# Patient Record
Sex: FEMALE | Race: WHITE | NOT HISPANIC OR LATINO
[De-identification: names, ages, dates, MRNs, and addresses within clinical notes are randomized per-mention and may not be internally consistent; named-entity substitution may affect disease eponyms.]

---

## 2019-04-07 ENCOUNTER — TRANSCRIPTION ENCOUNTER (OUTPATIENT)
Age: 29
End: 2019-04-07

## 2019-05-21 ENCOUNTER — TRANSCRIPTION ENCOUNTER (OUTPATIENT)
Age: 29
End: 2019-05-21

## 2020-02-24 ENCOUNTER — TRANSCRIPTION ENCOUNTER (OUTPATIENT)
Age: 30
End: 2020-02-24

## 2020-10-01 ENCOUNTER — APPOINTMENT (OUTPATIENT)
Dept: INTERNAL MEDICINE | Facility: CLINIC | Age: 30
End: 2020-10-01
Payer: COMMERCIAL

## 2020-10-01 VITALS
WEIGHT: 173 LBS | TEMPERATURE: 97.6 F | OXYGEN SATURATION: 97 % | SYSTOLIC BLOOD PRESSURE: 112 MMHG | RESPIRATION RATE: 14 BRPM | HEART RATE: 94 BPM | DIASTOLIC BLOOD PRESSURE: 70 MMHG | BODY MASS INDEX: 27.15 KG/M2 | HEIGHT: 67 IN

## 2020-10-01 DIAGNOSIS — E88.81 METABOLIC SYNDROME: ICD-10-CM

## 2020-10-01 DIAGNOSIS — Z00.00 ENCOUNTER FOR GENERAL ADULT MEDICAL EXAMINATION W/OUT ABNORMAL FINDINGS: ICD-10-CM

## 2020-10-01 DIAGNOSIS — K63.5 POLYP OF COLON: ICD-10-CM

## 2020-10-01 DIAGNOSIS — Z78.9 OTHER SPECIFIED HEALTH STATUS: ICD-10-CM

## 2020-10-01 PROCEDURE — 36415 COLL VENOUS BLD VENIPUNCTURE: CPT

## 2020-10-01 PROCEDURE — G0442 ANNUAL ALCOHOL SCREEN 15 MIN: CPT | Mod: NC

## 2020-10-01 PROCEDURE — 99385 PREV VISIT NEW AGE 18-39: CPT | Mod: 25

## 2020-10-01 PROCEDURE — G0444 DEPRESSION SCREEN ANNUAL: CPT | Mod: NC,59

## 2020-10-05 LAB
ALBUMIN SERPL ELPH-MCNC: 4.8 G/DL
ALP BLD-CCNC: 54 U/L
ALT SERPL-CCNC: 13 U/L
ANION GAP SERPL CALC-SCNC: 13 MMOL/L
AST SERPL-CCNC: 14 U/L
BASOPHILS # BLD AUTO: 0.02 K/UL
BASOPHILS NFR BLD AUTO: 0.3 %
BILIRUB SERPL-MCNC: 0.3 MG/DL
BUN SERPL-MCNC: 17 MG/DL
CALCIUM SERPL-MCNC: 9.2 MG/DL
CHLORIDE SERPL-SCNC: 102 MMOL/L
CHOLEST SERPL-MCNC: 169 MG/DL
CHOLEST/HDLC SERPL: 3.1 RATIO
CO2 SERPL-SCNC: 22 MMOL/L
CREAT SERPL-MCNC: 0.72 MG/DL
CREAT SPEC-SCNC: 33 MG/DL
EOSINOPHIL # BLD AUTO: 0.15 K/UL
EOSINOPHIL NFR BLD AUTO: 2.4 %
ESTIMATED AVERAGE GLUCOSE: 94 MG/DL
GLUCOSE SERPL-MCNC: 82 MG/DL
HBA1C MFR BLD HPLC: 4.9 %
HCT VFR BLD CALC: 43.3 %
HDLC SERPL-MCNC: 55 MG/DL
HGB BLD-MCNC: 14 G/DL
IMM GRANULOCYTES NFR BLD AUTO: 0.2 %
LDLC SERPL CALC-MCNC: 96 MG/DL
LYMPHOCYTES # BLD AUTO: 1.4 K/UL
LYMPHOCYTES NFR BLD AUTO: 22.7 %
MAN DIFF?: NORMAL
MCHC RBC-ENTMCNC: 32 PG
MCHC RBC-ENTMCNC: 32.3 GM/DL
MCV RBC AUTO: 98.9 FL
MICROALBUMIN 24H UR DL<=1MG/L-MCNC: <1.2 MG/DL
MICROALBUMIN/CREAT 24H UR-RTO: NORMAL MG/G
MONOCYTES # BLD AUTO: 0.47 K/UL
MONOCYTES NFR BLD AUTO: 7.6 %
NEUTROPHILS # BLD AUTO: 4.12 K/UL
NEUTROPHILS NFR BLD AUTO: 66.8 %
PLATELET # BLD AUTO: 221 K/UL
POTASSIUM SERPL-SCNC: 3.9 MMOL/L
PROT SERPL-MCNC: 7.2 G/DL
RBC # BLD: 4.38 M/UL
RBC # FLD: 12.7 %
SODIUM SERPL-SCNC: 137 MMOL/L
TRIGL SERPL-MCNC: 90 MG/DL
WBC # FLD AUTO: 6.17 K/UL

## 2020-10-08 ENCOUNTER — TRANSCRIPTION ENCOUNTER (OUTPATIENT)
Age: 30
End: 2020-10-08

## 2020-10-14 ENCOUNTER — APPOINTMENT (OUTPATIENT)
Dept: INTERNAL MEDICINE | Facility: CLINIC | Age: 30
End: 2020-10-14

## 2020-10-20 ENCOUNTER — APPOINTMENT (OUTPATIENT)
Dept: INTERNAL MEDICINE | Facility: CLINIC | Age: 30
End: 2020-10-20

## 2020-11-16 ENCOUNTER — TRANSCRIPTION ENCOUNTER (OUTPATIENT)
Age: 30
End: 2020-11-16

## 2021-05-19 ENCOUNTER — APPOINTMENT (OUTPATIENT)
Dept: INTERNAL MEDICINE | Facility: CLINIC | Age: 31
End: 2021-05-19

## 2021-09-20 ENCOUNTER — TRANSCRIPTION ENCOUNTER (OUTPATIENT)
Age: 31
End: 2021-09-20

## 2021-09-22 ENCOUNTER — TRANSCRIPTION ENCOUNTER (OUTPATIENT)
Age: 31
End: 2021-09-22

## 2021-09-24 ENCOUNTER — OUTPATIENT (OUTPATIENT)
Dept: OUTPATIENT SERVICES | Facility: HOSPITAL | Age: 31
LOS: 1 days | End: 2021-09-24
Payer: COMMERCIAL

## 2021-09-24 ENCOUNTER — APPOINTMENT (OUTPATIENT)
Dept: DISASTER EMERGENCY | Facility: HOSPITAL | Age: 31
End: 2021-09-24

## 2021-09-24 ENCOUNTER — EMERGENCY (EMERGENCY)
Facility: HOSPITAL | Age: 31
LOS: 1 days | Discharge: STILL ADMITTED OR ADMINISTRATIVE | End: 2021-09-24
Attending: EMERGENCY MEDICINE
Payer: COMMERCIAL

## 2021-09-24 VITALS
RESPIRATION RATE: 20 BRPM | WEIGHT: 175.05 LBS | HEART RATE: 77 BPM | TEMPERATURE: 99 F | DIASTOLIC BLOOD PRESSURE: 61 MMHG | SYSTOLIC BLOOD PRESSURE: 104 MMHG | HEIGHT: 66 IN | OXYGEN SATURATION: 99 %

## 2021-09-24 VITALS
RESPIRATION RATE: 16 BRPM | HEART RATE: 71 BPM | SYSTOLIC BLOOD PRESSURE: 101 MMHG | DIASTOLIC BLOOD PRESSURE: 64 MMHG | OXYGEN SATURATION: 100 % | TEMPERATURE: 98 F

## 2021-09-24 VITALS — WEIGHT: 179.9 LBS | HEIGHT: 66 IN

## 2021-09-24 DIAGNOSIS — U07.1 COVID-19: ICD-10-CM

## 2021-09-24 PROCEDURE — 99284 EMERGENCY DEPT VISIT MOD MDM: CPT

## 2021-09-24 PROCEDURE — 93970 EXTREMITY STUDY: CPT | Mod: 26

## 2021-09-24 RX ORDER — SODIUM CHLORIDE 9 MG/ML
250 INJECTION INTRAMUSCULAR; INTRAVENOUS; SUBCUTANEOUS
Refills: 0 | Status: COMPLETED | OUTPATIENT
Start: 2021-09-24 | End: 2021-09-24

## 2021-09-24 RX ADMIN — SODIUM CHLORIDE 310 MILLILITER(S): 9 INJECTION INTRAMUSCULAR; INTRAVENOUS; SUBCUTANEOUS at 12:41

## 2021-09-24 NOTE — ED PROVIDER NOTE - OBJECTIVE STATEMENT
29 y/o F pt with pmhx of dm presenting today with c/o bilateral leg pain L>R x1 day. Dx covid+ 2 days prior, c/o cough, fever, congestion x4 days. Received covid ab infusion today and was sent to the ED for further eval after the infusion. Pt denies any chest pain, dyspnea, fevers, n/v/d, abdominal pain, dysuria, headache, cough, congestion, sore throat, neck pain, back pain, weakness, numbness, tingling, dizziness, syncope, or other complaint.

## 2021-09-24 NOTE — ED PROVIDER NOTE - NSFOLLOWUPINSTRUCTIONS_ED_ALL_ED_FT
Davis Cyst    WHAT YOU NEED TO KNOW:    A Baker cyst is a bulging lump of fluid behind your knee. A Baker cyst can develop if you have a knee injury, or a condition such as osteoarthritis or a connective tissue disorder. A Baker cyst may also be called a popliteal cyst.    DISCHARGE INSTRUCTIONS:    Return to the emergency department if:   •You have severe pain.      •You have bruising on the ankle below the cyst.      •Your calf turns blue below the cyst.      •Your calf or knee is swollen or bleeding.      Call your doctor if:   •You have a fever.      •Your pain does not improve with medicine.      •You have questions or concerns about your condition or care.      Medicines:   •NSAIDs, such as ibuprofen, help decrease swelling, pain, and fever. NSAIDs can cause stomach bleeding or kidney problems in certain people. If you take blood thinner medicine, always ask your healthcare provider if NSAIDs are safe for you. Always read the medicine label and follow directions.      •Take your medicine as directed. Contact your healthcare provider if you think your medicine is not helping or if you have side effects. Tell him of her if you are allergic to any medicine. Keep a list of the medicines, vitamins, and herbs you take. Include the amounts, and when and why you take them. Bring the list or the pill bottles to follow-up visits. Carry your medicine list with you in case of an emergency.      Care for your knee:   •Rest as needed. Limit movement as your knee heals. This will help decrease the risk of more damage to your knee. You may need crutches to take weight off your injured knee. Use crutches as directed.      •Ice your knee. Ice helps decrease swelling and pain. Use an ice pack, or put ice in a plastic bag. Cover it with a towel before you place it on your skin. Ice your knee for 15 to 20 minutes, 3 to 4 times each day. Do this for 2 to 3 days.      •Support your knee. Wrap your knee with an elastic bandage. Ask your healthcare provider if you need a brace for more support. This will help decrease swelling and movement so your knee can heal.      •Elevate your knee. Use pillows to raise your knee above the level of your heart as often as you can. This will help decrease swelling. Do not put the pillow directly under your knee. Put it under your calf instead.

## 2021-09-24 NOTE — ED PROVIDER NOTE - PHYSICAL EXAMINATION
Gen: no acute distress  Head: normocephalic, atraumatic  Lung: CTAB, no respiratory distress, no wheezing, rales, rhonchi  CV: normal s1/s2, rrr,   Abd: soft, no tenderness, non-distended  MSK: No edema, no visible deformities, full range of motion in all 4 extremities, bilateral calf tenderness L>R, no edema  Neuro: No focal neurologic deficits  Skin: No rash   Psych: normal affect

## 2021-09-24 NOTE — ED PROVIDER NOTE - CLINICAL SUMMARY MEDICAL DECISION MAKING FREE TEXT BOX
Pt covid+ sent for evaluation of 1 day of bilateral calf pain L>R. No chest pain, sob, spo2 100% on ra. Will duplex BLE, reeval.

## 2021-09-24 NOTE — CHART NOTE - NSCHARTNOTEFT_GEN_A_CORE
CC: Monoclonal Antibody Infusion/COVID 19 Positive on 9/22/21      History: Patient presents for infusion of monoclonal antibody infusion. Patient has been screened and was deemed to be a candidate.    Symptoms/ Criteria: Fever, cough, malaise, HA    Risk Profile includes: DM ( insulin resistance), pt was not vaccinated.    casirivimab/imdevimab in sodium chloride 0.9% (EUA) IVPB 100 milliLiter(s) IV Intermittent once  sodium chloride 0.9%. 250 milliLiter(s) IV Continuous <Continuous>      PMHx:  Infection due to severe acute respiratory syndrome coronavirus 2 (SARS-CoV-2)          T(C): --  HR: --  BP: --  RR: 20  SpO2: 100%      PE:   Appearance: NAD	  HEENT:   Normal oral mucosa.   Lymphatic: No lymphadenopathy  Cardiovascular: Normal S1 S2, No JVD, No murmurs, No edema  Respiratory: Lungs clear to auscultation	  Gastrointestinal:  Soft, Non-tender. No guarding   Skin: warm and dry  Neurologic: Non-focal  Extremities: Normal range of motion.     ASSESSMENT:  Pt is a 30y year old Female Covid +  referred to the infusion center for Monoclonal antibody infusion (Regeneron).        PLAN:  - infusion procedure explained to patient   - Consent for monoclonal antibody infusion obtained   - Risk & benefits discussed/all questions answered  - infuse Regeneron over 21 minutes  - will observe patient for one hour post infusion  and then if stable discharge home with oupt follow up as planned by PMD.    POST INFUSION ASSESSMENT:   DISCHARGE at approximately  1  hour post infusion    - Patient tolerated infusion well denies complaints of chest pain/SOB/dizziness/ palps  - VSS for discharge home  - D/C instructions given/ fact sheet included.  - Patient to follow-up with PCP as needed. CC: Monoclonal Antibody Infusion/COVID 19 Positive on 9/22/21      History: Patient presents for infusion of monoclonal antibody infusion. Patient has been screened and was deemed to be a candidate.    Symptoms/ Criteria: Fever, cough, malaise, HA    Risk Profile includes: DM ( insulin resistance), pt was not vaccinated.    casirivimab/imdevimab in sodium chloride 0.9% (EUA) IVPB 100 milliLiter(s) IV Intermittent once  sodium chloride 0.9%. 250 milliLiter(s) IV Continuous <Continuous>      PMHx:  Infection due to severe acute respiratory syndrome coronavirus 2 (SARS-CoV-2)          T(F): 98.4  HR: 74  BP: 100/68  RR: 20  SpO2: 100%      PE:   Appearance: NAD	  HEENT:   Normal oral mucosa.   Lymphatic: No lymphadenopathy  Cardiovascular: Normal S1 S2, No JVD, No murmurs, No edema  Respiratory: Lungs clear to auscultation	  Gastrointestinal:  Soft, Non-tender. No guarding   Skin: warm and dry  Neurologic: Non-focal  Extremities: Normal range of motion.     ASSESSMENT:  Pt is a 30y year old Female Covid +  referred to the infusion center for Monoclonal antibody infusion (Regeneron).  Pt is not vaccinated. Pt currently has her period-pregnancy waiver signed.      PLAN:  - infusion procedure explained to patient   - Consent for monoclonal antibody infusion obtained   - Risk & benefits discussed/all questions answered  - infuse Regeneron over 21 minutes  - will observe patient for one hour post infusion  and then if stable discharge home with oupt follow up as planned by PMD.    POST INFUSION ASSESSMENT:   DISCHARGE at approximately  1  hour post infusion    - Patient tolerated infusion well denies complaints of chest pain/SOB/dizziness/ palps  - VSS for discharge home  - D/C instructions given/ fact sheet included.  - Patient to follow-up with PCP as needed.

## 2021-09-24 NOTE — ED PROVIDER NOTE - PATIENT PORTAL LINK FT
You can access the FollowMyHealth Patient Portal offered by [facility] by registering at the following website: [email or web address]. By joining [employer]’s FollowMyHealth portal, you will also be able to view your health information using other applications (apps) compatible with our system.

## 2021-09-30 ENCOUNTER — TRANSCRIPTION ENCOUNTER (OUTPATIENT)
Age: 31
End: 2021-09-30

## 2021-10-05 ENCOUNTER — APPOINTMENT (OUTPATIENT)
Dept: INTERNAL MEDICINE | Facility: CLINIC | Age: 31
End: 2021-10-05
Payer: COMMERCIAL

## 2021-10-05 VITALS
BODY MASS INDEX: 27.47 KG/M2 | DIASTOLIC BLOOD PRESSURE: 70 MMHG | RESPIRATION RATE: 11 BRPM | HEIGHT: 67 IN | SYSTOLIC BLOOD PRESSURE: 110 MMHG | WEIGHT: 175 LBS | HEART RATE: 74 BPM | TEMPERATURE: 97.7 F

## 2021-10-05 DIAGNOSIS — U07.1 COVID-19: ICD-10-CM

## 2021-10-05 DIAGNOSIS — K21.9 GASTRO-ESOPHAGEAL REFLUX DISEASE W/OUT ESOPHAGITIS: ICD-10-CM

## 2021-10-05 PROCEDURE — 99213 OFFICE O/P EST LOW 20 MIN: CPT

## 2021-10-05 RX ORDER — PHENTERMINE HCL 100 %
POWDER (GRAM) MISCELLANEOUS
Refills: 0 | Status: DISCONTINUED | COMMUNITY
Start: 2020-10-01 | End: 2021-10-05

## 2021-10-05 RX ORDER — LIRAGLUTIDE 6 MG/ML
18 INJECTION SUBCUTANEOUS DAILY
Refills: 0 | Status: DISCONTINUED | COMMUNITY
Start: 2020-10-01 | End: 2021-10-05

## 2021-10-05 RX ORDER — SEMAGLUTIDE 1.34 MG/ML
2 INJECTION, SOLUTION SUBCUTANEOUS
Qty: 4 | Refills: 1 | Status: ACTIVE | COMMUNITY
Start: 2021-10-05

## 2021-10-05 RX ORDER — OMEPRAZOLE 40 MG/1
40 CAPSULE, DELAYED RELEASE ORAL
Refills: 0 | Status: ACTIVE | COMMUNITY
Start: 2021-10-05

## 2021-10-17 ENCOUNTER — TRANSCRIPTION ENCOUNTER (OUTPATIENT)
Age: 31
End: 2021-10-17

## 2021-10-28 ENCOUNTER — TRANSCRIPTION ENCOUNTER (OUTPATIENT)
Age: 31
End: 2021-10-28

## 2022-03-26 ENCOUNTER — TRANSCRIPTION ENCOUNTER (OUTPATIENT)
Age: 32
End: 2022-03-26

## 2022-11-01 ENCOUNTER — NON-APPOINTMENT (OUTPATIENT)
Age: 32
End: 2022-11-01

## 2022-12-01 ENCOUNTER — TRANSCRIPTION ENCOUNTER (OUTPATIENT)
Age: 32
End: 2022-12-01

## 2023-04-22 ENCOUNTER — EMERGENCY (EMERGENCY)
Facility: HOSPITAL | Age: 33
LOS: 1 days | Discharge: HOME | End: 2023-04-22
Attending: EMERGENCY MEDICINE
Payer: COMMERCIAL

## 2023-04-22 VITALS
OXYGEN SATURATION: 100 % | SYSTOLIC BLOOD PRESSURE: 117 MMHG | RESPIRATION RATE: 20 BRPM | HEART RATE: 75 BPM | TEMPERATURE: 98 F | DIASTOLIC BLOOD PRESSURE: 65 MMHG

## 2023-04-22 PROCEDURE — 99284 EMERGENCY DEPT VISIT MOD MDM: CPT

## 2023-04-23 PROCEDURE — 93971 EXTREMITY STUDY: CPT | Mod: 26,LT

## 2023-04-23 NOTE — ED PROVIDER NOTE - NS ED ATTENDING STATEMENT MOD
This was a shared visit with the FELIPE. I reviewed and verified the documentation and independently performed the documented:

## 2023-04-23 NOTE — ED PROVIDER NOTE - OBJECTIVE STATEMENT
32F with no pmh who is 5 days postpartum presenting with left calf pain x 4 days. Pt states after discharge from the hospital after birth she had b/l LE edema that is improving. Left calf then began having pain.   Denies injury. No fevers, chills, no h/o blood clots. 32F with no pmh who is 5 days postpartum presenting with left calf pain x 4 days. Pt states after discharge from the hospital after birth she had b/l LE edema that is improving. Left calf then began having pain.   Denies injury. No fevers, chills, cp, sob, no h/o blood clots.

## 2023-04-23 NOTE — ED PROVIDER NOTE - CLINICAL SUMMARY MEDICAL DECISION MAKING FREE TEXT BOX
32F who is 5 days postpartem presenting with left calf pain x 4 days. Pt declining pain meds. US duplex ordered. 32F who is 5 days postpartem presenting with left calf pain x 4 days. PE with minimal tenderness. no edema. no discoloration.   Pt declining pain meds. US duplex ordered.  US without DVT. Discussed findings with pt. Pt to FU with her OB and pcp. Given return precautions.

## 2023-04-23 NOTE — ED PROVIDER NOTE - PATIENT PORTAL LINK FT
You can access the FollowMyHealth Patient Portal offered by [facility] by registering at the following website: [email or web address]. By joining [email or web address]’s FollowMyHealth portal, you will also be able to view your health information using other applications (apps) compatible with our system.

## 2023-12-03 ENCOUNTER — NON-APPOINTMENT (OUTPATIENT)
Age: 33
End: 2023-12-03

## 2024-01-29 ENCOUNTER — NON-APPOINTMENT (OUTPATIENT)
Age: 34
End: 2024-01-29

## 2024-04-13 ENCOUNTER — NON-APPOINTMENT (OUTPATIENT)
Age: 34
End: 2024-04-13

## 2024-05-20 ENCOUNTER — NON-APPOINTMENT (OUTPATIENT)
Age: 34
End: 2024-05-20

## 2024-07-24 ENCOUNTER — APPOINTMENT (OUTPATIENT)
Dept: INTERNAL MEDICINE | Facility: CLINIC | Age: 34
End: 2024-07-24
Payer: COMMERCIAL

## 2024-07-24 VITALS
HEIGHT: 67 IN | WEIGHT: 151 LBS | TEMPERATURE: 97.5 F | DIASTOLIC BLOOD PRESSURE: 70 MMHG | OXYGEN SATURATION: 99 % | SYSTOLIC BLOOD PRESSURE: 108 MMHG | BODY MASS INDEX: 23.7 KG/M2 | HEART RATE: 80 BPM

## 2024-07-24 DIAGNOSIS — M54.2 CERVICALGIA: ICD-10-CM

## 2024-07-24 DIAGNOSIS — R91.1 SOLITARY PULMONARY NODULE: ICD-10-CM

## 2024-07-24 DIAGNOSIS — M25.50 PAIN IN UNSPECIFIED JOINT: ICD-10-CM

## 2024-07-24 DIAGNOSIS — R07.81 PLEURODYNIA: ICD-10-CM

## 2024-07-24 DIAGNOSIS — S20.02XA CONTUSION OF LEFT BREAST, INITIAL ENCOUNTER: ICD-10-CM
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
## 2024-07-24 PROCEDURE — G2211 COMPLEX E/M VISIT ADD ON: CPT | Mod: NC,1L

## 2024-07-24 PROCEDURE — 99204 OFFICE O/P NEW MOD 45 MIN: CPT

## 2024-07-24 RX ORDER — TIRZEPATIDE 10 MG/.5ML
10 INJECTION, SOLUTION SUBCUTANEOUS
Refills: 0 | Status: ACTIVE | COMMUNITY

## 2024-07-24 RX ORDER — BUDESONIDE 32 UG/1
SPRAY, METERED NASAL
Refills: 0 | Status: ACTIVE | COMMUNITY

## 2024-07-24 NOTE — PLAN
[FreeTextEntry1] : Hospital records incl all imaging studies reviewed  Advised nsaids / tylenol prn pain  to ck rib xrays  adequate rest  f/u in 2 weeks / sooner if needed  mammogram ordered for breast contusion
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
## 2024-07-24 NOTE — PHYSICAL EXAM
[Soft] : abdomen soft [Non-distended] : non-distended [Normal Bowel Sounds] : normal bowel sounds [Coordination Grossly Intact] : coordination grossly intact [Normal] : normal rate, regular rhythm, normal S1 and S2 and no murmur heard [de-identified] : tender  [de-identified] : left rib cage tenderness [de-identified] : multiple skin brusing

## 2024-07-24 NOTE — REVIEW OF SYSTEMS
[Joint Pain] : joint pain [Muscle Pain] : muscle pain [Back Pain] : back pain [Headache] : headache [Negative] : Integumentary [Shortness Of Breath] : no shortness of breath [Wheezing] : no wheezing [Cough] : no cough [Dyspnea on Exertion] : no dyspnea on exertion [Dizziness] : no dizziness [Fainting] : no fainting [Unsteady Walking] : no ataxia

## 2024-07-26 ENCOUNTER — APPOINTMENT (OUTPATIENT)
Dept: ORTHOPEDIC SURGERY | Facility: CLINIC | Age: 34
End: 2024-07-26

## 2024-07-26 VITALS — HEIGHT: 66 IN | BODY MASS INDEX: 24.11 KG/M2 | WEIGHT: 150 LBS

## 2024-07-26 DIAGNOSIS — M54.12 RADICULOPATHY, CERVICAL REGION: ICD-10-CM

## 2024-07-26 DIAGNOSIS — S06.0X9A CONCUSSION WITH LOSS OF CONSCIOUSNESS OF UNSPECIFIED DURATION, INITIAL ENCOUNTER: ICD-10-CM

## 2024-07-26 DIAGNOSIS — M54.16 RADICULOPATHY, LUMBAR REGION: ICD-10-CM

## 2024-07-26 DIAGNOSIS — Z78.9 OTHER SPECIFIED HEALTH STATUS: ICD-10-CM

## 2024-07-26 PROCEDURE — 99204 OFFICE O/P NEW MOD 45 MIN: CPT

## 2024-07-29 ENCOUNTER — APPOINTMENT (OUTPATIENT)
Dept: ORTHOPEDIC SURGERY | Facility: CLINIC | Age: 34
End: 2024-07-29
Payer: COMMERCIAL

## 2024-07-29 DIAGNOSIS — Z00.00 ENCOUNTER FOR GENERAL ADULT MEDICAL EXAMINATION W/OUT ABNORMAL FINDINGS: ICD-10-CM

## 2024-07-29 DIAGNOSIS — S20.219A CONTUSION OF UNSPECIFIED FRONT WALL OF THORAX, INITIAL ENCOUNTER: ICD-10-CM
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
## 2024-07-29 DIAGNOSIS — S80.12XA CONTUSION OF LEFT KNEE, INITIAL ENCOUNTER: ICD-10-CM

## 2024-07-29 DIAGNOSIS — S80.02XA CONTUSION OF LEFT KNEE, INITIAL ENCOUNTER: ICD-10-CM

## 2024-07-29 DIAGNOSIS — S89.92XA UNSPECIFIED INJURY OF LEFT LOWER LEG, INITIAL ENCOUNTER: ICD-10-CM

## 2024-07-29 DIAGNOSIS — S40.012A CONTUSION OF LEFT SHOULDER, INITIAL ENCOUNTER: ICD-10-CM

## 2024-07-29 PROBLEM — S49.90XA SHOULDER INJURY, INITIAL ENCOUNTER: Status: ACTIVE | Noted: 2024-07-29

## 2024-07-29 PROCEDURE — 99205 OFFICE O/P NEW HI 60 MIN: CPT

## 2024-07-29 PROCEDURE — 71100 X-RAY EXAM RIBS UNI 2 VIEWS: CPT | Mod: LT

## 2024-07-29 NOTE — DATA REVIEWED
[Knee] : knee [CT Scan] : CT scan [I independently reviewed and interpreted images and report] : I independently reviewed and interpreted images and report [I reviewed the films/CD] : I reviewed the films/CD [Outside X-rays] : outside x-rays [Shoulder] : shoulder [FreeTextEntry2] : CT Chest 7/20/24  ([facility]) IMPRESSION:  Limited study due to streak artifact and delayed phase of contrast.  Small focus of hypervascularity noted on image 103 series 3 anterior to the aorta and near the celiac trunk origin. Surrounding mild infiltrative changes are suspected. Dedicated CTA with and without IV contrast is recommended for reevaluation and exclusion of vascular injury.  Left lower quadrant superficial soft tissue contusion with 2 cm hematoma, image 109 series 8. Additional left gluteal soft tissue contusion. Superficial soft tissue contusion suspected of the left breast. Follow-up mammogram is recommended to ensure resolution of left breast finding.  No evidence of acute solid organ injury. No displaced rib fracture or pneumothorax. No displaced pelvic fracture. Dedicated spinal reformatted CT from the same day will be reviewed and reported on separately. [FreeTextEntry3] : 7/20/24 ([facility]) IMPRESSION: No acute finding. [FreeTextEntry4] : CT angio chest aorta [facility] 7/20/24 IMPRESSION: * No acute chest pathology. * Short segment occlusion versus narrowing of the celiac artery origin. No extravasation. * Small focus of hyperdensity along the left aspect of the celiac trunk likely corresponds to a small branch vessel.  [FreeTextEntry1] : 7/20/24 XR L Elbow, Forearm, Wrist and Hand ([facility]) IMPRESSION: No fracture.  7/20/24: CT Abdomen and Pelvis ([facility]) IMPRESSION: Limited study due to streak artifact and delayed phase of contrast. Small focus of hypervascularity noted on image 103 series 3 anterior to the aorta and near the celiac trunk origin. Surrounding mild infiltrative changes are suspected. Dedicated CTA with and without IV contrast is recommended for reevaluation and exclusion of vascular injury. Left lower quadrant superficial soft tissue contusion with 2 cm hematoma, image 109 series 8. Additional left gluteal soft tissue contusion. Superficial soft tissue contusion suspected of the left breast. Follow-up mammogram is recommended to ensure resolution of left breast finding. No evidence of acute solid organ injury. No displaced rib fracture or pneumothorax. No displaced pelvic fracture. Dedicated spinal reformatted CT from the same day will be reviewed and reported on separately.
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
## 2024-07-29 NOTE — PHYSICAL EXAM
[Able to Communicate] : able to communicate [Well Developed] : well developed [Well Nourished] : well nourished [Left] : left shoulder [Sitting] : sitting [Moderate] : moderate [] : motor and sensory intact distally [Pain with Pronation] : pain with pronation [Pain with Supination] : pain with supination [FreeTextEntry8] : good AP/lat chest excursion, but, with moderate pain, tender left intercostals diffusely, > mid axillary [FreeTextEntry3] : diffuse resolving ecchymosis LUE, abrasion left olecranon, mild upper extremity swelling [de-identified] : to gross screen [FreeTextEntry9] : motion with pain to forearm/wrist
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
## 2024-07-30 ENCOUNTER — APPOINTMENT (OUTPATIENT)
Dept: MRI IMAGING | Facility: CLINIC | Age: 34
End: 2024-07-30
Payer: COMMERCIAL

## 2024-07-30 ENCOUNTER — NON-APPOINTMENT (OUTPATIENT)
Age: 34
End: 2024-07-30

## 2024-07-30 PROCEDURE — 72141 MRI NECK SPINE W/O DYE: CPT

## 2024-07-30 PROCEDURE — 72148 MRI LUMBAR SPINE W/O DYE: CPT

## 2024-08-01 ENCOUNTER — APPOINTMENT (OUTPATIENT)
Dept: MRI IMAGING | Facility: CLINIC | Age: 34
End: 2024-08-01
Payer: COMMERCIAL

## 2024-08-01 PROCEDURE — 73221 MRI JOINT UPR EXTREM W/O DYE: CPT | Mod: LT

## 2024-08-01 PROCEDURE — 73721 MRI JNT OF LWR EXTRE W/O DYE: CPT | Mod: LT

## 2024-08-03 NOTE — DATA REVIEWED
[Report was reviewed and noted in the chart] : The report was reviewed and noted in the chart [I independently reviewed and interpreted images and report] : I independently reviewed and interpreted images and report [FreeTextEntry1] :  CT scan chest- 07/20/24: NL   NW 07/20/24 Head CT scan- NL, no evidence for ICH.   NW 07/20/24 CT C-spine- cervical collar in place, no fxs, no listhesis,   NW 07/20/24 Ct scan thoracic spine- no fxs, no dislocations.   NW 07/20/24 CT angiogram of brain- no ICH.   NW 07/20/24 CT angiogram of neck- no vascular injury, small nodule in RT lung apex.   NW 07/20/24 CT abdomen- lt lower quadrant abdominal wall contusion and hematoma, and LT breast contusion.  no ribs fxs or pelvic fxs.   NW 07/20/24 LT shoulder x-ray- no fxs  NW 07/20/24 LT elbow x-ray- no fxs-   NW 07/20/24 LT knee xr- no fxs.   NW 07/20/24 lt wrist x-ray- no fxs.   NW 07/20/24 lt forearm xr- no fxs.   NW 07/20/24 lt hand x-ray- no fxs.   NW 07/20/24 lt ankle x-ray- no fxs.   NW 07/20/24 CT scan lumbar- viewed in both bone and soft tissue windows, appears to be some disc protrusion at L4-5 in soft tissue windows, hard to assess by CT scan alone. No evidence for fxs. l5-s1 showing possible HNP.
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
## 2024-08-05 ENCOUNTER — APPOINTMENT (OUTPATIENT)
Dept: ORTHOPEDIC SURGERY | Facility: CLINIC | Age: 34
End: 2024-08-05

## 2024-08-05 PROBLEM — M75.22 BICEPS TENDINITIS OF LEFT UPPER EXTREMITY: Status: ACTIVE | Noted: 2024-08-05

## 2024-08-05 PROBLEM — S83.242A OTHER TEAR OF MEDIAL MENISCUS OF LEFT KNEE AS CURRENT INJURY, INITIAL ENCOUNTER: Status: ACTIVE | Noted: 2024-08-05

## 2024-08-05 PROBLEM — M23.91 INTERNAL DERANGEMENT OF RIGHT KNEE: Status: ACTIVE | Noted: 2024-08-05

## 2024-08-05 PROBLEM — S83.412A MCL SPRAIN OF LEFT KNEE: Status: ACTIVE | Noted: 2024-08-05

## 2024-08-05 PROBLEM — S43.422A SPRAIN OF LEFT ROTATOR CUFF CAPSULE, INITIAL ENCOUNTER: Status: ACTIVE | Noted: 2024-08-05

## 2024-08-05 PROCEDURE — 20611 DRAIN/INJ JOINT/BURSA W/US: CPT | Mod: LT

## 2024-08-05 PROCEDURE — 99215 OFFICE O/P EST HI 40 MIN: CPT | Mod: 25

## 2024-08-06 ENCOUNTER — APPOINTMENT (OUTPATIENT)
Dept: INTERNAL MEDICINE | Facility: CLINIC | Age: 34
End: 2024-08-06

## 2024-08-07 ENCOUNTER — APPOINTMENT (OUTPATIENT)
Dept: MRI IMAGING | Facility: CLINIC | Age: 34
End: 2024-08-07

## 2024-08-07 PROCEDURE — 73721 MRI JNT OF LWR EXTRE W/O DYE: CPT | Mod: RT

## 2024-08-07 NOTE — HISTORY OF PRESENT ILLNESS
[de-identified] : 08/05/2024: Patient present today for follow-up. Underwent an MRI since last visit, and is here to discuss the imaging results. Patient is feeling the same. She also endorses right shoulder and right knee pain as well, that is worsening.

## 2024-08-07 NOTE — IMAGING
[de-identified] : The patient is a well appearing 33 year old female of their stated age. Negative straight leg raise bilateral   RIGHT Knee:                    ROM:  0-130 degrees   Lachman: Negative Pivot Shift: Negative Anterior Drawer: Negative Posterior Drawer / Sag: Negative Varus Stress 0 degrees: Stable Varus Stress 30 degrees: Stable Valgus Stress 0 degrees: Stable Valgus Stress 30 degrees: Stable Medial Diane: Positive Lateral Diane: Negative Patella Glide: 2+ Patella Apprehension: Negative Patella Grind: Negative   Palpation: Medial Joint Line: TTP Lateral Joint Line: Nontender Medial Collateral Ligament: Nontender Lateral Collateral Ligament/PLC: Nontender Distal Femur: Nontender Proximal Tibia: Nontender Tibial Tubercle: Nontender Distal Pole Patella: Nontender Quadriceps Tendon: Nontender &  Intact Patella Tendon: Nontender &  Intact Medial Distal Hamstring/PES: Nontender Lateral Distal Hamstring: Nontender & Stable Iliotibial Band: Nontender Medial Patellofemoral Ligament: Nontender Adductor: Nontender Proximal GSC-Plantaris: Nontender Calf: Supple & Nontender   Inspection: Deformity: No Erythema: No Ecchymosis: No Abrasions: No Effusion: Moderate Prepatellar Bursitis: No   Neurologic Exam: Sensation L4-S1: Grossly Intact   Motor Exam: Quadriceps: 5 out of 5 Hamstrings: 5 out of 5 EHL: 5 out of 5 FHL: 5 out of 5 TA: 5 out of 5 GS: 5 out of 5   Circulatory/Pulses: Dorsalis Pedis: 2+ Posterior Tibialis: 2+   Additional Pertinent Findings: None   Contralateral Knee:               ROM: 0-130 degrees   Other Pertinent Findings: None    X-RAYS of the RIGHT knee (4 views, including AP, Lateral, Merchant, and Tunnel): no fracture or dislocation

## 2024-08-07 NOTE — PHYSICAL EXAM
[Able to Communicate] : able to communicate [Well Developed] : well developed [Well Nourished] : well nourished [Left] : left shoulder [Sitting] : sitting [Moderate] : moderate [Pain with Pronation] : pain with pronation [Pain with Supination] : pain with supination [FreeTextEntry8] : good AP/lat chest excursion, but, with moderate pain, tender left intercostals diffusely, > mid axillary [Right] : right shoulder [] : motor and sensory intact distally [FreeTextEntry3] : diffuse resolving ecchymosis LUE, abrasion left olecranon, mild upper extremity swelling [de-identified] : to gross screen [FreeTextEntry9] : motion with pain to forearm/wrist

## 2024-08-07 NOTE — PHYSICAL EXAM
[Able to Communicate] : able to communicate [Well Developed] : well developed [Well Nourished] : well nourished [Left] : left shoulder [Sitting] : sitting [Moderate] : moderate [Pain with Pronation] : pain with pronation [Pain with Supination] : pain with supination [FreeTextEntry8] : good AP/lat chest excursion, but, with moderate pain, tender left intercostals diffusely, > mid axillary [Right] : right shoulder [] : motor and sensory intact distally [FreeTextEntry3] : diffuse resolving ecchymosis LUE, abrasion left olecranon, mild upper extremity swelling [de-identified] : to gross screen [FreeTextEntry9] : motion with pain to forearm/wrist
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
## 2024-08-07 NOTE — HISTORY OF PRESENT ILLNESS
[de-identified] : 08/05/2024: Patient present today for follow-up. Underwent an MRI since last visit, and is here to discuss the imaging results. Patient is feeling the same. She also endorses right shoulder and right knee pain as well, that is worsening.

## 2024-08-07 NOTE — DATA REVIEWED
[MRI] : MRI [Left] : left [Shoulder] : shoulder [Knee] : knee [I independently reviewed and interpreted images and report] : I independently reviewed and interpreted images and report [FreeTextEntry1] : No report available for review at this time
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
## 2024-08-07 NOTE — IMAGING
[de-identified] : The patient is a well appearing 33 year old female of their stated age. Negative straight leg raise bilateral   RIGHT Knee:                    ROM:  0-130 degrees   Lachman: Negative Pivot Shift: Negative Anterior Drawer: Negative Posterior Drawer / Sag: Negative Varus Stress 0 degrees: Stable Varus Stress 30 degrees: Stable Valgus Stress 0 degrees: Stable Valgus Stress 30 degrees: Stable Medial Diane: Positive Lateral Diane: Negative Patella Glide: 2+ Patella Apprehension: Negative Patella Grind: Negative   Palpation: Medial Joint Line: TTP Lateral Joint Line: Nontender Medial Collateral Ligament: Nontender Lateral Collateral Ligament/PLC: Nontender Distal Femur: Nontender Proximal Tibia: Nontender Tibial Tubercle: Nontender Distal Pole Patella: Nontender Quadriceps Tendon: Nontender &  Intact Patella Tendon: Nontender &  Intact Medial Distal Hamstring/PES: Nontender Lateral Distal Hamstring: Nontender & Stable Iliotibial Band: Nontender Medial Patellofemoral Ligament: Nontender Adductor: Nontender Proximal GSC-Plantaris: Nontender Calf: Supple & Nontender   Inspection: Deformity: No Erythema: No Ecchymosis: No Abrasions: No Effusion: Moderate Prepatellar Bursitis: No   Neurologic Exam: Sensation L4-S1: Grossly Intact   Motor Exam: Quadriceps: 5 out of 5 Hamstrings: 5 out of 5 EHL: 5 out of 5 FHL: 5 out of 5 TA: 5 out of 5 GS: 5 out of 5   Circulatory/Pulses: Dorsalis Pedis: 2+ Posterior Tibialis: 2+   Additional Pertinent Findings: None   Contralateral Knee:               ROM: 0-130 degrees   Other Pertinent Findings: None    X-RAYS of the RIGHT knee (4 views, including AP, Lateral, Merchant, and Tunnel): no fracture or dislocation
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
## 2024-08-08 ENCOUNTER — APPOINTMENT (OUTPATIENT)
Dept: MRI IMAGING | Facility: CLINIC | Age: 34
End: 2024-08-08

## 2024-08-08 ENCOUNTER — APPOINTMENT (OUTPATIENT)
Dept: ORTHOPEDIC SURGERY | Facility: CLINIC | Age: 34
End: 2024-08-08

## 2024-08-08 PROBLEM — M76.72 PERONEAL TENDINITIS OF LEFT LOWER EXTREMITY: Status: ACTIVE | Noted: 2024-08-08

## 2024-08-08 PROCEDURE — 73221 MRI JOINT UPR EXTREM W/O DYE: CPT | Mod: RT

## 2024-08-08 PROCEDURE — 99204 OFFICE O/P NEW MOD 45 MIN: CPT

## 2024-08-08 NOTE — DISCUSSION/SUMMARY
[de-identified] :  WBAT in supportive footwear. Recommend a course of PT. Ice to affected area. NSAIDS prn. Activity modification.
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
## 2024-08-08 NOTE — DATA REVIEWED
[Outside X-rays] : outside x-rays [Left] : left [Ankle] : ankle [I reviewed the films/CD] : I reviewed the films/CD [FreeTextEntry1] : [facility]: No acute fractures or bony abnormalities noted.

## 2024-08-09 ENCOUNTER — APPOINTMENT (OUTPATIENT)
Dept: ORTHOPEDIC SURGERY | Facility: CLINIC | Age: 34
End: 2024-08-09

## 2024-08-09 PROBLEM — M51.36 DISC DEGENERATION, LUMBAR: Status: ACTIVE | Noted: 2024-08-09

## 2024-08-09 PROBLEM — S39.012D STRAIN OF LUMBAR REGION, SUBSEQUENT ENCOUNTER: Status: ACTIVE | Noted: 2024-08-09

## 2024-08-09 PROBLEM — S29.012A STRAIN OF THORACIC SPINE: Status: ACTIVE | Noted: 2024-08-09

## 2024-08-09 PROBLEM — M51.26 LUMBAR DISC HERNIATION: Status: ACTIVE | Noted: 2024-08-09

## 2024-08-09 PROBLEM — S16.1XXD STRAIN OF NECK MUSCLE, SUBSEQUENT ENCOUNTER: Status: ACTIVE | Noted: 2024-08-09

## 2024-08-09 PROCEDURE — 99214 OFFICE O/P EST MOD 30 MIN: CPT
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
## 2024-08-11 NOTE — DATA REVIEWED
[Report was reviewed and noted in the chart] : The report was reviewed and noted in the chart [I independently reviewed and interpreted images and report] : I independently reviewed and interpreted images and report [FreeTextEntry1] : On my interpretation of these images from [facility] 07/30/24. I have additionally reviewed the radiologist report.  LUMBAR MRI-  L5-S1:NL L4-5: small central disc protrusion with annular tear and mild DDD, mild facet arthropathy.  L3-4: disc bulge w/ minimal stenosis.  L2-3: mild facet arthropathy L1-2: NL T12-L1: NL No evidence for fx or edema on MRI.   On my interpretation of these images from [facility] 07/30/24 I have additionally reviewed the radiologist report. CERVICAL MRI- several disc bulges w/o significant herniation. no stenosis.     CT scan chest- 07/20/24: NL   NW 07/20/24 Head CT scan- NL, no evidence for ICH.    07/20/24 CT C-spine- cervical collar in place, no fxs, no listhesis,   NW 07/20/24 Ct scan thoracic spine- no fxs, no dislocations.   NW 07/20/24 CT angiogram of brain- no ICH.   NW 07/20/24 CT angiogram of neck- no vascular injury, small nodule in RT lung apex.   NW 07/20/24 CT abdomen- lt lower quadrant abdominal wall contusion and hematoma, and LT breast contusion.  no ribs fxs or pelvic fxs.   NW 07/20/24 LT shoulder x-ray- no fxs  NW 07/20/24 LT elbow x-ray- no fxs-   NW 07/20/24 LT knee xr- no fxs.   NW 07/20/24 lt wrist x-ray- no fxs.   NW 07/20/24 lt forearm xr- no fxs.   NW 07/20/24 lt hand x-ray- no fxs.   NW 07/20/24 lt ankle x-ray- no fxs.   NW 07/20/24 CT scan lumbar- viewed in both bone and soft tissue windows, appears to be some disc protrusion at L4-5 in soft tissue windows, hard to assess by CT scan alone. No evidence for fxs. l5-s1 showing possible HNP.
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
## 2024-08-11 NOTE — DATA REVIEWED
[Report was reviewed and noted in the chart] : The report was reviewed and noted in the chart [I independently reviewed and interpreted images and report] : I independently reviewed and interpreted images and report [FreeTextEntry1] : On my interpretation of these images from [facility] 07/30/24. I have additionally reviewed the radiologist report.  LUMBAR MRI-  L5-S1:NL L4-5: small central disc protrusion with annular tear and mild DDD, mild facet arthropathy.  L3-4: disc bulge w/ minimal stenosis.  L2-3: mild facet arthropathy L1-2: NL T12-L1: NL No evidence for fx or edema on MRI.   On my interpretation of these images from [facility] 07/30/24 I have additionally reviewed the radiologist report. CERVICAL MRI- several disc bulges w/o significant herniation. no stenosis.     CT scan chest- 07/20/24: NL   NW 07/20/24 Head CT scan- NL, no evidence for ICH.    07/20/24 CT C-spine- cervical collar in place, no fxs, no listhesis,   NW 07/20/24 Ct scan thoracic spine- no fxs, no dislocations.   NW 07/20/24 CT angiogram of brain- no ICH.   NW 07/20/24 CT angiogram of neck- no vascular injury, small nodule in RT lung apex.   NW 07/20/24 CT abdomen- lt lower quadrant abdominal wall contusion and hematoma, and LT breast contusion.  no ribs fxs or pelvic fxs.   NW 07/20/24 LT shoulder x-ray- no fxs  NW 07/20/24 LT elbow x-ray- no fxs-   NW 07/20/24 LT knee xr- no fxs.   NW 07/20/24 lt wrist x-ray- no fxs.   NW 07/20/24 lt forearm xr- no fxs.   NW 07/20/24 lt hand x-ray- no fxs.   NW 07/20/24 lt ankle x-ray- no fxs.   NW 07/20/24 CT scan lumbar- viewed in both bone and soft tissue windows, appears to be some disc protrusion at L4-5 in soft tissue windows, hard to assess by CT scan alone. No evidence for fxs. l5-s1 showing possible HNP.

## 2024-08-11 NOTE — PHYSICAL EXAM
[Rotation to left] : rotation to left [Rotation to right] : rotation to right [Biceps 2+] : biceps 2+ [Triceps 2+] : triceps 2+ [Brachioradialis 2+] : brachioradialis 2+ [Flexion] : flexion [Extension] : extension [Bending to left] : bending to left [Bending to right] : bending to right [5___] : right extensor hallicus longus 5[unfilled]/5 [de-identified] : Constitutional: - General Appearance: Unremarkable Body Habitus Well Developed Well Nourished Body Habitus No Deformities Well Groomed Ability To communicate: Normal Neurologic: Global sensation is intact to upper and lower extremities. See examination of Neck and/or Spine for exceptions. Orientation to Time, Place and Person is: Normal Mood And Affect is Normal Skin: - Head/Face, Right Upper/Lower Extremity, Left Upper/Lower Extremity: Normal See Examination of Neck and/or Spine for exceptions Cardiovascular: Peripheral Cardiovascular System is Normal Palpation of Lymph Nodes: Normal Palpation of lymph nodes in: Axilla, Cervical, Inguinal Abnormal Palpation of lymph nodes in: None  [de-identified] : mild paresthesia's in forearm on LT.  [TWNoteComboBox7] : forward flexion 20 degrees [de-identified] : extension 20 degrees [de-identified] : left lateral rotation 25 degrees [TWNoteComboBox6] : right lateral rotation 45 degrees [] : non-antalgic [FreeTextEntry8] : diffused tenderness throughout entire paraspinals of back, but cervical, lumbar worse.  [FreeTextEntry9] : paraspinal pain with BL bending.
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
## 2024-08-11 NOTE — PHYSICAL EXAM
[Rotation to left] : rotation to left [Rotation to right] : rotation to right [Biceps 2+] : biceps 2+ [Triceps 2+] : triceps 2+ [Brachioradialis 2+] : brachioradialis 2+ [Flexion] : flexion [Extension] : extension [Bending to left] : bending to left [Bending to right] : bending to right [5___] : right extensor hallicus longus 5[unfilled]/5 [de-identified] : Constitutional: - General Appearance: Unremarkable Body Habitus Well Developed Well Nourished Body Habitus No Deformities Well Groomed Ability To communicate: Normal Neurologic: Global sensation is intact to upper and lower extremities. See examination of Neck and/or Spine for exceptions. Orientation to Time, Place and Person is: Normal Mood And Affect is Normal Skin: - Head/Face, Right Upper/Lower Extremity, Left Upper/Lower Extremity: Normal See Examination of Neck and/or Spine for exceptions Cardiovascular: Peripheral Cardiovascular System is Normal Palpation of Lymph Nodes: Normal Palpation of lymph nodes in: Axilla, Cervical, Inguinal Abnormal Palpation of lymph nodes in: None  [de-identified] : mild paresthesia's in forearm on LT.  [TWNoteComboBox7] : forward flexion 20 degrees [de-identified] : extension 20 degrees [de-identified] : left lateral rotation 25 degrees [TWNoteComboBox6] : right lateral rotation 45 degrees [] : non-antalgic [FreeTextEntry8] : diffused tenderness throughout entire paraspinals of back, but cervical, lumbar worse.  [FreeTextEntry9] : paraspinal pain with BL bending.

## 2024-08-14 ENCOUNTER — APPOINTMENT (OUTPATIENT)
Dept: INTERNAL MEDICINE | Facility: CLINIC | Age: 34
End: 2024-08-14
Payer: COMMERCIAL

## 2024-08-14 VITALS
TEMPERATURE: 97.2 F | OXYGEN SATURATION: 99 % | BODY MASS INDEX: 24.11 KG/M2 | HEIGHT: 66 IN | HEART RATE: 82 BPM | WEIGHT: 150 LBS | SYSTOLIC BLOOD PRESSURE: 103 MMHG | DIASTOLIC BLOOD PRESSURE: 69 MMHG

## 2024-08-14 DIAGNOSIS — R51.9 HEADACHE, UNSPECIFIED: ICD-10-CM

## 2024-08-14 DIAGNOSIS — U07.1 COVID-19: ICD-10-CM

## 2024-08-14 DIAGNOSIS — I70.8 ATHEROSCLEROSIS OF OTHER ARTERIES: ICD-10-CM

## 2024-08-14 DIAGNOSIS — S20.02XA CONTUSION OF LEFT BREAST, INITIAL ENCOUNTER: ICD-10-CM

## 2024-08-14 DIAGNOSIS — M25.50 PAIN IN UNSPECIFIED JOINT: ICD-10-CM

## 2024-08-14 DIAGNOSIS — S39.012D STRAIN OF MUSCLE, FASCIA AND TENDON OF LOWER BACK, SUBSEQUENT ENCOUNTER: ICD-10-CM

## 2024-08-14 DIAGNOSIS — S20.212S CONTUSION OF LEFT FRONT WALL OF THORAX, SEQUELA: ICD-10-CM

## 2024-08-14 DIAGNOSIS — S43.422A SPRAIN OF LEFT ROTATOR CUFF CAPSULE, INITIAL ENCOUNTER: ICD-10-CM

## 2024-08-14 PROCEDURE — G2211 COMPLEX E/M VISIT ADD ON: CPT | Mod: NC,1L

## 2024-08-14 PROCEDURE — 99214 OFFICE O/P EST MOD 30 MIN: CPT

## 2024-08-14 NOTE — PLAN
[FreeTextEntry1] : Hospital records incl all imaging studies reviewed  ? celiac artery occlusion vs narrowing - to consult vascular ? need for further w/u  Advised nsaids l prn pain for joint pain / back pain - will f/u with ortho and start PT  mammogram / US ordered for breast contusion to ck MRI brain and f/u neuro   f/u in 2 weeks / sooner if needed
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
## 2024-08-14 NOTE — PHYSICAL EXAM
[Normal] : normal rate, regular rhythm, normal S1 and S2 and no murmur heard [Soft] : abdomen soft [Non Tender] : non-tender [Non-distended] : non-distended [Normal Bowel Sounds] : normal bowel sounds [Coordination Grossly Intact] : coordination grossly intact [de-identified] : left rib cage tenderness [de-identified] : multiple skin brusing - healing, left elbow healing abrasion

## 2024-08-15 ENCOUNTER — APPOINTMENT (OUTPATIENT)
Dept: INTERNAL MEDICINE | Facility: CLINIC | Age: 34
End: 2024-08-15
Payer: COMMERCIAL

## 2024-08-15 DIAGNOSIS — J02.9 ACUTE PHARYNGITIS, UNSPECIFIED: ICD-10-CM

## 2024-08-15 PROCEDURE — G2211 COMPLEX E/M VISIT ADD ON: CPT

## 2024-08-15 PROCEDURE — 99213 OFFICE O/P EST LOW 20 MIN: CPT

## 2024-08-15 NOTE — HISTORY OF PRESENT ILLNESS
[FreeTextEntry8] : Visit was done using both audio and video modalities.  Savi was in [facility] and Dr Pérez was in [facility] at the time of visit  c/o sore throat, mild cough for 2 days  no fever / sob or ear pain , no sinus pain  child sick at home with strep had throat culture yesterday - results pending

## 2024-08-15 NOTE — PLAN
[FreeTextEntry1] : Recommend supportive care including antipyretics, fluids, OTC cough/cold medications, await throat Cx results call if symptoms worsen or persist.

## 2024-08-16 ENCOUNTER — APPOINTMENT (OUTPATIENT)
Dept: PAIN MANAGEMENT | Facility: CLINIC | Age: 34
End: 2024-08-16

## 2024-08-16 VITALS
SYSTOLIC BLOOD PRESSURE: 95 MMHG | HEART RATE: 69 BPM | HEIGHT: 66 IN | WEIGHT: 150 LBS | BODY MASS INDEX: 24.11 KG/M2 | DIASTOLIC BLOOD PRESSURE: 61 MMHG

## 2024-08-16 LAB — BACTERIA THROAT CULT: NORMAL

## 2024-08-16 PROCEDURE — 99205 OFFICE O/P NEW HI 60 MIN: CPT

## 2024-08-16 RX ORDER — NABUMETONE 750 MG/1
750 TABLET, FILM COATED ORAL
Qty: 30 | Refills: 1 | Status: ACTIVE | COMMUNITY
Start: 2024-08-16 | End: 1900-01-01
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
## 2024-08-16 NOTE — PHYSICAL EXAM
[Person] : Oriented to self [Time] : Oriented to time [Nystagmus] : Nystagmus present [Symptoms with Head Turns and Ambulation] : Symptoms with head turns and ambulation [Vestibular Ocular Reflex Normal] : Vestibular ocular reflex normal [Limitations: ___] : Limitations: [unfilled] [Cervical Tenderness] : Cervical tenderness is present [Location: ___] : Location: [unfilled] [Normal] : Deep tendon reflexes are 2+ and symmetric. [Saccades] : No saccades [Symptoms with Head Turns on Fixed Point] : No symptoms present with head turns on a fixed point [Full Cervical ROM] : Full cervical ROM is limited [Single Stance] : Single stance is abnormal [Tandem Stance] : Tandem stance is abnormal [de-identified] : LUE with decreased sensation PP/LT as well as dysethesia [de-identified] : unsteady with tandem walking
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
## 2024-08-16 NOTE — PHYSICAL EXAM
[Person] : Oriented to self [Time] : Oriented to time [Nystagmus] : Nystagmus present [Symptoms with Head Turns and Ambulation] : Symptoms with head turns and ambulation [Vestibular Ocular Reflex Normal] : Vestibular ocular reflex normal [Limitations: ___] : Limitations: [unfilled] [Cervical Tenderness] : Cervical tenderness is present [Location: ___] : Location: [unfilled] [Normal] : Deep tendon reflexes are 2+ and symmetric. [Saccades] : No saccades [Symptoms with Head Turns on Fixed Point] : No symptoms present with head turns on a fixed point [Full Cervical ROM] : Full cervical ROM is limited [Single Stance] : Single stance is abnormal [Tandem Stance] : Tandem stance is abnormal [de-identified] : LUE with decreased sensation PP/LT as well as dysethesia [de-identified] : unsteady with tandem walking

## 2024-08-19 ENCOUNTER — APPOINTMENT (OUTPATIENT)
Dept: ORTHOPEDIC SURGERY | Facility: CLINIC | Age: 34
End: 2024-08-19

## 2024-08-19 DIAGNOSIS — M75.111 INCOMPLETE ROTATOR CUFF TEAR OR RUPTURE OF RIGHT SHOULDER, NOT SPECIFIED AS TRAUMATIC: ICD-10-CM

## 2024-08-19 PROCEDURE — 99214 OFFICE O/P EST MOD 30 MIN: CPT

## 2024-08-27 ENCOUNTER — APPOINTMENT (OUTPATIENT)
Dept: MAMMOGRAPHY | Facility: CLINIC | Age: 34
End: 2024-08-27

## 2024-08-27 ENCOUNTER — OUTPATIENT (OUTPATIENT)
Dept: OUTPATIENT SERVICES | Facility: HOSPITAL | Age: 34
LOS: 1 days | End: 2024-08-27
Payer: COMMERCIAL

## 2024-08-27 ENCOUNTER — RESULT REVIEW (OUTPATIENT)
Age: 34
End: 2024-08-27

## 2024-08-27 ENCOUNTER — APPOINTMENT (OUTPATIENT)
Dept: ULTRASOUND IMAGING | Facility: CLINIC | Age: 34
End: 2024-08-27

## 2024-08-27 DIAGNOSIS — S20.02XA CONTUSION OF LEFT BREAST, INITIAL ENCOUNTER: ICD-10-CM

## 2024-08-27 PROCEDURE — G0279: CPT

## 2024-08-27 PROCEDURE — G0279: CPT | Mod: 26

## 2024-08-27 PROCEDURE — 76641 ULTRASOUND BREAST COMPLETE: CPT | Mod: 26,LT

## 2024-08-27 PROCEDURE — 76641 ULTRASOUND BREAST COMPLETE: CPT

## 2024-08-27 PROCEDURE — 77066 DX MAMMO INCL CAD BI: CPT | Mod: 26

## 2024-08-27 PROCEDURE — 77066 DX MAMMO INCL CAD BI: CPT

## 2024-08-28 ENCOUNTER — APPOINTMENT (OUTPATIENT)
Dept: PAIN MANAGEMENT | Facility: CLINIC | Age: 34
End: 2024-08-28
Payer: COMMERCIAL

## 2024-08-28 VITALS
BODY MASS INDEX: 24.11 KG/M2 | SYSTOLIC BLOOD PRESSURE: 102 MMHG | HEIGHT: 66 IN | HEART RATE: 77 BPM | WEIGHT: 150 LBS | DIASTOLIC BLOOD PRESSURE: 67 MMHG

## 2024-08-28 PROCEDURE — 99214 OFFICE O/P EST MOD 30 MIN: CPT

## 2024-08-28 PROCEDURE — G2211 COMPLEX E/M VISIT ADD ON: CPT | Mod: NC,1L
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
## 2024-08-28 NOTE — CARE PLAN
[Headache management] : Recommended therapeutic intervention for headaches. [Ice] : Recommended ice to sore areas as needed.  [Heat] : Recommended warm compress to sore area as needed. [NSAIDs] : Recommended NSAIDs for pain control. [Vestibular Therapy for balance/dizziness evaluation] : Vestibular therapy for balance and dizziness [Follow up appointment recommended ___] : Follow up appointment recommended [unfilled]

## 2024-08-28 NOTE — PHYSICAL EXAM
[Person] : Oriented to self [Time] : Oriented to time [Nystagmus] : Nystagmus present [Symptoms with Head Turns and Ambulation] : Symptoms with head turns and ambulation [Limitations: ___] : Limitations: [unfilled] [Cervical Tenderness] : Cervical tenderness is present [Location: ___] : Location: [unfilled] [Normal] : Deep tendon reflexes are 2+ and symmetric. [Saccades] : No saccades [Symptoms with Head Turns on Fixed Point] : No symptoms present with head turns on a fixed point [Vestibular Ocular Reflex Normal] : Vestibular ocular reflex abnormal [Full Cervical ROM] : Full cervical ROM is limited [Single Stance] : Single stance is abnormal [Tandem Stance] : Tandem stance is abnormal [de-identified] : near point convergence > 10cm [de-identified] : LUE with decreased sensation PP/LT as well as dysethesia [de-identified] : unsteady with tandem walking

## 2024-08-28 NOTE — QUALITY MEASURES
[Anxiety/depression] : Anxiety/depression: Yes [Headaches] : Headaches: Yes [Sleep disorders] : Sleep disorders: Yes [Educated about return to activity] : Educated about return to activity: Yes [Educated about return to sports] : Educated about return to sports: Yes  [Educated about return to work] : Educated about return to work: Yes [Educated about reinjury and minimizing risk] : Educated about reinjury and minimizing risk: Yes  [Educated about sleep hygiene] : Educated about sleep hygiene: Yes  [Educated about conservative pain management options] : Educated about conservative pain management options: Yes

## 2024-08-28 NOTE — REASON FOR VISIT
[Follow-up Evaluation] : a follow-up evaluation [Head Injury Evaluation] : a head injury evaluation [Headache] : a headache [Neck Pain] : neck pain

## 2024-08-30 ENCOUNTER — APPOINTMENT (OUTPATIENT)
Dept: INTERNAL MEDICINE | Facility: CLINIC | Age: 34
End: 2024-08-30
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
## 2024-08-30 NOTE — PHYSICAL EXAM
[Able to Communicate] : able to communicate [Well Developed] : well developed [Well Nourished] : well nourished [Left] : left shoulder [Sitting] : sitting [Moderate] : moderate [Right] : right shoulder [] : motor and sensory intact distally [Pain with Pronation] : pain with pronation [Pain with Supination] : pain with supination [FreeTextEntry8] : good AP/lat chest excursion, but, with moderate pain, tender left intercostals diffusely, > mid axillary [FreeTextEntry3] : diffuse resolving ecchymosis LUE, abrasion left olecranon, mild upper extremity swelling [de-identified] : to gross screen [FreeTextEntry9] : motion with pain to forearm/wrist

## 2024-08-30 NOTE — IMAGING
[de-identified] : The patient is a well appearing 33 year old female of their stated age. Negative straight leg raise bilateral   RIGHT Knee:                    ROM:  0-130 degrees   Lachman: Negative Pivot Shift: Negative Anterior Drawer: Negative Posterior Drawer / Sag: Negative Varus Stress 0 degrees: Stable Varus Stress 30 degrees: Stable Valgus Stress 0 degrees: Stable Valgus Stress 30 degrees: Stable Medial Diane: Positive Lateral Diane: Negative Patella Glide: 2+ Patella Apprehension: Negative Patella Grind: Negative   Palpation: Medial Joint Line: TTP Lateral Joint Line: Nontender Medial Collateral Ligament: Nontender Lateral Collateral Ligament/PLC: Nontender Distal Femur: Nontender Proximal Tibia: Nontender Tibial Tubercle: Nontender Distal Pole Patella: Nontender Quadriceps Tendon: Nontender &  Intact Patella Tendon: Nontender &  Intact Medial Distal Hamstring/PES: Nontender Lateral Distal Hamstring: Nontender & Stable Iliotibial Band: Nontender Medial Patellofemoral Ligament: Nontender Adductor: Nontender Proximal GSC-Plantaris: Nontender Calf: Supple & Nontender   Inspection: Deformity: No Erythema: No Ecchymosis: No Abrasions: No Effusion: Moderate Prepatellar Bursitis: No   Neurologic Exam: Sensation L4-S1: Grossly Intact   Motor Exam: Quadriceps: 5 out of 5 Hamstrings: 5 out of 5 EHL: 5 out of 5 FHL: 5 out of 5 TA: 5 out of 5 GS: 5 out of 5   Circulatory/Pulses: Dorsalis Pedis: 2+ Posterior Tibialis: 2+   Additional Pertinent Findings: None   Contralateral Knee:               ROM: 0-130 degrees   Other Pertinent Findings: None    X-RAYS of the RIGHT knee (4 views, including AP, Lateral, Merchant, and Tunnel): no fracture or dislocation

## 2024-08-30 NOTE — PHYSICAL EXAM
[Able to Communicate] : able to communicate [Well Developed] : well developed [Well Nourished] : well nourished [Left] : left shoulder [Sitting] : sitting [Moderate] : moderate [Right] : right shoulder [] : motor and sensory intact distally [Pain with Pronation] : pain with pronation [Pain with Supination] : pain with supination [FreeTextEntry8] : good AP/lat chest excursion, but, with moderate pain, tender left intercostals diffusely, > mid axillary [FreeTextEntry3] : diffuse resolving ecchymosis LUE, abrasion left olecranon, mild upper extremity swelling [de-identified] : to gross screen [FreeTextEntry9] : motion with pain to forearm/wrist
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
## 2024-08-30 NOTE — HISTORY OF PRESENT ILLNESS
[de-identified] : 08/19/2024: Patient present today for follow-up of right shoulder and right knee. Underwent MRI prior to last visit, reports now available for review.  08/05/2024: Patient present today for follow-up. Underwent an MRI since last visit, and is here to discuss the imaging results. Patient is feeling the same. She also endorses right shoulder and right knee pain as well, that is worsening.

## 2024-08-30 NOTE — IMAGING
[de-identified] : The patient is a well appearing 33 year old female of their stated age. Negative straight leg raise bilateral   RIGHT Knee:                    ROM:  0-130 degrees   Lachman: Negative Pivot Shift: Negative Anterior Drawer: Negative Posterior Drawer / Sag: Negative Varus Stress 0 degrees: Stable Varus Stress 30 degrees: Stable Valgus Stress 0 degrees: Stable Valgus Stress 30 degrees: Stable Medial Diane: Positive Lateral Daine: Negative Patella Glide: 2+ Patella Apprehension: Negative Patella Grind: Negative   Palpation: Medial Joint Line: TTP Lateral Joint Line: Nontender Medial Collateral Ligament: Nontender Lateral Collateral Ligament/PLC: Nontender Distal Femur: Nontender Proximal Tibia: Nontender Tibial Tubercle: Nontender Distal Pole Patella: Nontender Quadriceps Tendon: Nontender &  Intact Patella Tendon: Nontender &  Intact Medial Distal Hamstring/PES: Nontender Lateral Distal Hamstring: Nontender & Stable Iliotibial Band: Nontender Medial Patellofemoral Ligament: Nontender Adductor: Nontender Proximal GSC-Plantaris: Nontender Calf: Supple & Nontender   Inspection: Deformity: No Erythema: No Ecchymosis: No Abrasions: No Effusion: Moderate Prepatellar Bursitis: No   Neurologic Exam: Sensation L4-S1: Grossly Intact   Motor Exam: Quadriceps: 5 out of 5 Hamstrings: 5 out of 5 EHL: 5 out of 5 FHL: 5 out of 5 TA: 5 out of 5 GS: 5 out of 5   Circulatory/Pulses: Dorsalis Pedis: 2+ Posterior Tibialis: 2+   Additional Pertinent Findings: None   Contralateral Knee:               ROM: 0-130 degrees   Other Pertinent Findings: None    X-RAYS of the RIGHT knee (4 views, including AP, Lateral, Merchant, and Tunnel): no fracture or dislocation

## 2024-08-30 NOTE — HISTORY OF PRESENT ILLNESS
[de-identified] : 08/19/2024: Patient present today for follow-up of right shoulder and right knee. Underwent MRI prior to last visit, reports now available for review.  08/05/2024: Patient present today for follow-up. Underwent an MRI since last visit, and is here to discuss the imaging results. Patient is feeling the same. She also endorses right shoulder and right knee pain as well, that is worsening.

## 2024-09-12 ENCOUNTER — APPOINTMENT (OUTPATIENT)
Dept: ORTHOPEDIC SURGERY | Facility: CLINIC | Age: 34
End: 2024-09-12

## 2024-09-13 DIAGNOSIS — J30.9 ALLERGIC RHINITIS, UNSPECIFIED: ICD-10-CM

## 2024-09-20 ENCOUNTER — APPOINTMENT (OUTPATIENT)
Dept: ORTHOPEDIC SURGERY | Facility: CLINIC | Age: 34
End: 2024-09-20

## 2024-09-23 ENCOUNTER — NON-APPOINTMENT (OUTPATIENT)
Age: 34
End: 2024-09-23

## 2024-09-24 DIAGNOSIS — J45.40 MODERATE PERSISTENT ASTHMA, UNCOMPLICATED: ICD-10-CM

## 2024-09-24 RX ORDER — BUDESONIDE AND FORMOTEROL FUMARATE DIHYDRATE 160; 4.5 UG/1; UG/1
160-4.5 AEROSOL RESPIRATORY (INHALATION) TWICE DAILY
Qty: 1 | Refills: 3 | Status: ACTIVE | COMMUNITY
Start: 2024-09-24 | End: 1900-01-01

## 2024-10-02 ENCOUNTER — APPOINTMENT (OUTPATIENT)
Dept: INTERNAL MEDICINE | Facility: CLINIC | Age: 34
End: 2024-10-02
Payer: COMMERCIAL

## 2024-10-02 VITALS
BODY MASS INDEX: 24.11 KG/M2 | HEART RATE: 79 BPM | HEIGHT: 66 IN | DIASTOLIC BLOOD PRESSURE: 45 MMHG | RESPIRATION RATE: 16 BRPM | OXYGEN SATURATION: 99 % | SYSTOLIC BLOOD PRESSURE: 106 MMHG | WEIGHT: 150 LBS | TEMPERATURE: 97.6 F

## 2024-10-02 DIAGNOSIS — J45.40 MODERATE PERSISTENT ASTHMA, UNCOMPLICATED: ICD-10-CM

## 2024-10-02 DIAGNOSIS — Z00.00 ENCOUNTER FOR GENERAL ADULT MEDICAL EXAMINATION W/OUT ABNORMAL FINDINGS: ICD-10-CM

## 2024-10-02 DIAGNOSIS — R09.89 OTHER SPECIFIED SYMPTOMS AND SIGNS INVOLVING THE CIRCULATORY AND RESPIRATORY SYSTEMS: ICD-10-CM

## 2024-10-02 PROCEDURE — 99395 PREV VISIT EST AGE 18-39: CPT

## 2024-10-02 PROCEDURE — G0444 DEPRESSION SCREEN ANNUAL: CPT | Mod: 59

## 2024-10-02 RX ORDER — MONTELUKAST 10 MG/1
10 TABLET, FILM COATED ORAL
Qty: 90 | Refills: 0 | Status: ACTIVE | COMMUNITY
Start: 2024-10-02 | End: 1900-01-01

## 2024-10-02 NOTE — PLAN
[FreeTextEntry1] :    Annual Physical:  Counseled on dietary modification, daily regular exercise.  Increase fiber/ plant-based diet and decrease intake of meats and animal-based diet.  To routinely use seat belts. Self- breast and skin exams advised.  Immunizations discussed and counseled. Age-appropriate screening exams discussed. Mammo / breast US discussed, advised SBE / left breast lump to be monitored ? abdominal bruit - to ck US and follow up.  Asthma: stable on inh, to add singulair - will try step down therapy.

## 2024-10-02 NOTE — PHYSICAL EXAM
[Soft] : abdomen soft [Non Tender] : non-tender [Non-distended] : non-distended [Normal Bowel Sounds] : normal bowel sounds [Coordination Grossly Intact] : coordination grossly intact [No CVA Tenderness] : no CVA  tenderness [No Rash] : no rash [Speech Grossly Normal] : speech grossly normal [Normal] : affect was normal and insight and judgment were intact [de-identified] : abdominal bruit heard  [de-identified] : left breast - left upper quadrant 2-3 cm palpable lump, freely mobile, non tender.

## 2024-10-02 NOTE — HEALTH RISK ASSESSMENT
[Very Good] : ~his/her~  mood as very good [Yes] : Yes [Little interest or pleasure doing things] : 1) Little interest or pleasure doing things [Feeling down, depressed, or hopeless] : 2) Feeling down, depressed, or hopeless [0] : 2) Feeling down, depressed, or hopeless: Not at all (0) [PHQ-2 Negative - No further assessment needed] : PHQ-2 Negative - No further assessment needed [NO] : No [HIV Test offered] : HIV Test offered [Hepatitis C test offered] : Hepatitis C test offered [None] : None [With Family] : lives with family [] :  [Sexually Active] : sexually active [Fully functional (bathing, dressing, toileting, transferring, walking, feeding)] : Fully functional (bathing, dressing, toileting, transferring, walking, feeding) [Fully functional (using the telephone, shopping, preparing meals, housekeeping, doing laundry, using] : Fully functional and needs no help or supervision to perform IADLs (using the telephone, shopping, preparing meals, housekeeping, doing laundry, using transportation, managing medications and managing finances) [Smoke Detector] : smoke detector [Carbon Monoxide Detector] : carbon monoxide detector [Seat Belt] :  uses seat belt [Sunscreen] : uses sunscreen [Never] : Never [de-identified] : social [de-identified] : active [de-identified] : good [Change in mental status noted] : No change in mental status noted [Language] : denies difficulty with language [Handling Complex Tasks] : denies difficulty handling complex tasks [High Risk Behavior] : no high risk behavior [TB Exposure] : is not being exposed to tuberculosis

## 2024-10-02 NOTE — HISTORY OF PRESENT ILLNESS
[FreeTextEntry1] : Physical [de-identified] : Pt with h/o Asthma - stable on meds  Insulin resistance - follows with endocrine , stable on Mounjaro here for PE

## 2024-10-03 LAB
25(OH)D3 SERPL-MCNC: 23.4 NG/ML
ALBUMIN SERPL ELPH-MCNC: 4.4 G/DL
ALP BLD-CCNC: 45 U/L
ALT SERPL-CCNC: 13 U/L
ANION GAP SERPL CALC-SCNC: 13 MMOL/L
AST SERPL-CCNC: 16 U/L
BILIRUB SERPL-MCNC: 0.5 MG/DL
BUN SERPL-MCNC: 13 MG/DL
CALCIUM SERPL-MCNC: 8.9 MG/DL
CHLORIDE SERPL-SCNC: 106 MMOL/L
CHOLEST SERPL-MCNC: 165 MG/DL
CO2 SERPL-SCNC: 22 MMOL/L
CREAT SERPL-MCNC: 0.75 MG/DL
EGFR: 106 ML/MIN/1.73M2
ESTIMATED AVERAGE GLUCOSE: 94 MG/DL
GLUCOSE SERPL-MCNC: 69 MG/DL
HAV IGM SER QL: NONREACTIVE
HBA1C MFR BLD HPLC: 4.9 %
HBV CORE IGM SER QL: NONREACTIVE
HBV SURFACE AG SER QL: NONREACTIVE
HCT VFR BLD CALC: 39.1 %
HCV AB SER QL: NONREACTIVE
HCV S/CO RATIO: 0.13 S/CO
HDLC SERPL-MCNC: 60 MG/DL
HGB BLD-MCNC: 12.8 G/DL
LDLC SERPL CALC-MCNC: 96 MG/DL
MCHC RBC-ENTMCNC: 31.8 PG
MCHC RBC-ENTMCNC: 32.7 GM/DL
MCV RBC AUTO: 97 FL
NONHDLC SERPL-MCNC: 105 MG/DL
PLATELET # BLD AUTO: 212 K/UL
POTASSIUM SERPL-SCNC: 4.7 MMOL/L
PROT SERPL-MCNC: 6.8 G/DL
RBC # BLD: 4.03 M/UL
RBC # FLD: 13.7 %
SODIUM SERPL-SCNC: 140 MMOL/L
TRIGL SERPL-MCNC: 45 MG/DL
TSH SERPL-ACNC: 2.73 UIU/ML
URATE SERPL-MCNC: 3.5 MG/DL
VIT B12 SERPL-MCNC: 348 PG/ML
WBC # FLD AUTO: 3.96 K/UL

## 2024-10-04 LAB — HIV1+2 AB SPEC QL IA.RAPID: NONREACTIVE

## 2024-10-09 ENCOUNTER — APPOINTMENT (OUTPATIENT)
Dept: PAIN MANAGEMENT | Facility: CLINIC | Age: 34
End: 2024-10-09

## 2024-10-09 ENCOUNTER — APPOINTMENT (OUTPATIENT)
Dept: PAIN MANAGEMENT | Facility: CLINIC | Age: 34
End: 2024-10-09
Payer: COMMERCIAL

## 2024-10-09 PROCEDURE — 99213 OFFICE O/P EST LOW 20 MIN: CPT | Mod: 95

## 2024-10-09 RX ORDER — CYCLOBENZAPRINE HYDROCHLORIDE 5 MG/1
5 TABLET, FILM COATED ORAL
Qty: 60 | Refills: 2 | Status: ACTIVE | COMMUNITY
Start: 2024-10-09 | End: 1900-01-01

## 2024-10-09 RX ORDER — MELOXICAM 7.5 MG/1
7.5 TABLET ORAL
Qty: 60 | Refills: 1 | Status: ACTIVE | COMMUNITY
Start: 2024-10-09 | End: 1900-01-01

## 2024-10-11 ENCOUNTER — APPOINTMENT (OUTPATIENT)
Dept: ULTRASOUND IMAGING | Facility: CLINIC | Age: 34
End: 2024-10-11
Payer: COMMERCIAL

## 2024-10-11 ENCOUNTER — RESULT REVIEW (OUTPATIENT)
Age: 34
End: 2024-10-11

## 2024-10-11 ENCOUNTER — OUTPATIENT (OUTPATIENT)
Dept: OUTPATIENT SERVICES | Facility: HOSPITAL | Age: 34
LOS: 1 days | End: 2024-10-11
Payer: COMMERCIAL

## 2024-10-11 DIAGNOSIS — Z00.8 ENCOUNTER FOR OTHER GENERAL EXAMINATION: ICD-10-CM

## 2024-10-11 PROCEDURE — 76775 US EXAM ABDO BACK WALL LIM: CPT | Mod: 26,59

## 2024-10-11 PROCEDURE — 76775 US EXAM ABDO BACK WALL LIM: CPT

## 2024-10-11 PROCEDURE — 93975 VASCULAR STUDY: CPT

## 2024-10-11 PROCEDURE — 93975 VASCULAR STUDY: CPT | Mod: 26

## 2024-11-27 ENCOUNTER — APPOINTMENT (OUTPATIENT)
Dept: PAIN MANAGEMENT | Facility: CLINIC | Age: 34
End: 2024-11-27

## 2024-12-02 ENCOUNTER — APPOINTMENT (OUTPATIENT)
Dept: INTERNAL MEDICINE | Facility: CLINIC | Age: 34
End: 2024-12-02

## 2024-12-09 ENCOUNTER — APPOINTMENT (OUTPATIENT)
Dept: PAIN MANAGEMENT | Facility: CLINIC | Age: 34
End: 2024-12-09
Payer: COMMERCIAL

## 2024-12-09 PROCEDURE — 99213 OFFICE O/P EST LOW 20 MIN: CPT | Mod: 95

## 2024-12-30 ENCOUNTER — APPOINTMENT (OUTPATIENT)
Dept: INTERNAL MEDICINE | Facility: CLINIC | Age: 34
End: 2024-12-30
Payer: COMMERCIAL

## 2024-12-30 VITALS
TEMPERATURE: 97.5 F | SYSTOLIC BLOOD PRESSURE: 109 MMHG | HEIGHT: 66 IN | RESPIRATION RATE: 16 BRPM | OXYGEN SATURATION: 97 % | WEIGHT: 151 LBS | HEART RATE: 95 BPM | DIASTOLIC BLOOD PRESSURE: 62 MMHG | BODY MASS INDEX: 24.27 KG/M2

## 2024-12-30 DIAGNOSIS — J45.40 MODERATE PERSISTENT ASTHMA, UNCOMPLICATED: ICD-10-CM

## 2024-12-30 DIAGNOSIS — E55.9 VITAMIN D DEFICIENCY, UNSPECIFIED: ICD-10-CM

## 2024-12-30 DIAGNOSIS — E53.8 DEFICIENCY OF OTHER SPECIFIED B GROUP VITAMINS: ICD-10-CM

## 2024-12-30 PROCEDURE — 96372 THER/PROPH/DIAG INJ SC/IM: CPT

## 2024-12-30 PROCEDURE — 99214 OFFICE O/P EST MOD 30 MIN: CPT | Mod: 25

## 2024-12-30 PROCEDURE — G2211 COMPLEX E/M VISIT ADD ON: CPT | Mod: NC

## 2024-12-30 RX ORDER — AZITHROMYCIN 250 MG/1
250 TABLET, FILM COATED ORAL
Qty: 1 | Refills: 0 | Status: ACTIVE | COMMUNITY
Start: 2024-12-30 | End: 1900-01-01

## 2024-12-30 RX ORDER — ALBUTEROL SULFATE 90 UG/1
108 (90 BASE) INHALANT RESPIRATORY (INHALATION)
Qty: 8.5 | Refills: 3 | Status: ACTIVE | COMMUNITY
Start: 2024-12-30 | End: 1900-01-01

## 2024-12-30 RX ORDER — MULTIVIT-MIN/FOLIC/VIT K/LYCOP 400-300MCG
TABLET ORAL DAILY
Qty: 90 | Refills: 2 | Status: ACTIVE | COMMUNITY
Start: 2024-12-30 | End: 1900-01-01

## 2024-12-31 RX ORDER — CYANOCOBALAMIN 1000 UG/ML
1000 INJECTION, SOLUTION INTRAMUSCULAR; SUBCUTANEOUS
Qty: 0 | Refills: 0 | Status: COMPLETED | OUTPATIENT
Start: 2024-12-31

## 2024-12-31 RX ADMIN — CYANOCOBALAMIN 1 MCG/ML: 1000 INJECTION, SOLUTION INTRAMUSCULAR at 00:00

## 2025-05-05 ENCOUNTER — APPOINTMENT (OUTPATIENT)
Dept: ORTHOPEDIC SURGERY | Facility: CLINIC | Age: 35
End: 2025-05-05
Payer: COMMERCIAL

## 2025-05-05 DIAGNOSIS — M75.22 BICIPITAL TENDINITIS, LEFT SHOULDER: ICD-10-CM

## 2025-05-05 DIAGNOSIS — M75.111 INCOMPLETE ROTATOR CUFF TEAR OR RUPTURE OF RIGHT SHOULDER, NOT SPECIFIED AS TRAUMATIC: ICD-10-CM

## 2025-05-05 DIAGNOSIS — M24.812 OTHER SPECIFIC JOINT DERANGEMENTS OF LEFT SHOULDER, NOT ELSEWHERE CLASSIFIED: ICD-10-CM

## 2025-05-05 DIAGNOSIS — M24.811 OTHER SPECIFIC JOINT DERANGEMENTS OF RIGHT SHOULDER, NOT ELSEWHERE CLASSIFIED: ICD-10-CM

## 2025-05-05 PROCEDURE — 99214 OFFICE O/P EST MOD 30 MIN: CPT

## 2025-05-08 ENCOUNTER — APPOINTMENT (OUTPATIENT)
Dept: INTERNAL MEDICINE | Facility: CLINIC | Age: 35
End: 2025-05-08
Payer: COMMERCIAL

## 2025-05-08 VITALS
RESPIRATION RATE: 16 BRPM | TEMPERATURE: 98.2 F | SYSTOLIC BLOOD PRESSURE: 100 MMHG | WEIGHT: 153 LBS | OXYGEN SATURATION: 98 % | HEART RATE: 78 BPM | DIASTOLIC BLOOD PRESSURE: 56 MMHG | BODY MASS INDEX: 24.59 KG/M2 | HEIGHT: 66 IN

## 2025-05-08 DIAGNOSIS — E55.9 VITAMIN D DEFICIENCY, UNSPECIFIED: ICD-10-CM

## 2025-05-08 DIAGNOSIS — J45.41 MODERATE PERSISTENT ASTHMA WITH (ACUTE) EXACERBATION: ICD-10-CM

## 2025-05-08 DIAGNOSIS — E53.8 DEFICIENCY OF OTHER SPECIFIED B GROUP VITAMINS: ICD-10-CM

## 2025-05-08 PROCEDURE — G2211 COMPLEX E/M VISIT ADD ON: CPT | Mod: NC

## 2025-05-08 PROCEDURE — 99214 OFFICE O/P EST MOD 30 MIN: CPT

## 2025-05-08 RX ORDER — FEXOFENADINE HCL 180 MG/1
180 TABLET, FILM COATED ORAL DAILY
Qty: 30 | Refills: 2 | Status: ACTIVE | COMMUNITY
Start: 2025-05-08 | End: 1900-01-01

## 2025-05-08 RX ORDER — MONTELUKAST 10 MG/1
10 TABLET, FILM COATED ORAL DAILY
Qty: 1 | Refills: 3 | Status: ACTIVE | COMMUNITY
Start: 2025-05-08 | End: 1900-01-01

## 2025-05-13 ENCOUNTER — APPOINTMENT (OUTPATIENT)
Dept: MRI IMAGING | Facility: CLINIC | Age: 35
End: 2025-05-13

## 2025-05-15 ENCOUNTER — APPOINTMENT (OUTPATIENT)
Dept: INTERNAL MEDICINE | Facility: CLINIC | Age: 35
End: 2025-05-15

## 2025-05-16 ENCOUNTER — APPOINTMENT (OUTPATIENT)
Dept: MRI IMAGING | Facility: CLINIC | Age: 35
End: 2025-05-16

## 2025-06-02 ENCOUNTER — APPOINTMENT (OUTPATIENT)
Dept: ORTHOPEDIC SURGERY | Facility: CLINIC | Age: 35
End: 2025-06-02

## 2025-06-16 ENCOUNTER — APPOINTMENT (OUTPATIENT)
Dept: INTERNAL MEDICINE | Facility: CLINIC | Age: 35
End: 2025-06-16
Payer: COMMERCIAL

## 2025-06-16 VITALS
OXYGEN SATURATION: 97 % | TEMPERATURE: 97.2 F | DIASTOLIC BLOOD PRESSURE: 70 MMHG | SYSTOLIC BLOOD PRESSURE: 110 MMHG | BODY MASS INDEX: 25.23 KG/M2 | WEIGHT: 157 LBS | HEART RATE: 77 BPM | HEIGHT: 66 IN

## 2025-06-16 PROBLEM — Z01.818 PREOPERATIVE EXAMINATION: Status: ACTIVE | Noted: 2025-06-16 | Resolved: 2025-07-16

## 2025-06-16 PROBLEM — E88.810 INSULIN RESISTANCE SYNDROME: Status: ACTIVE | Noted: 2020-10-01

## 2025-06-16 PROBLEM — J45.40 MODERATE PERSISTENT ASTHMA WITHOUT COMPLICATION: Status: RESOLVED | Noted: 2024-09-24 | Resolved: 2025-06-16

## 2025-06-16 PROBLEM — E04.1 THYROID NODULE: Status: ACTIVE | Noted: 2025-06-16

## 2025-06-16 PROBLEM — J45.40 MODERATE PERSISTENT ASTHMA WITHOUT COMPLICATION: Status: ACTIVE | Noted: 2025-06-16

## 2025-06-16 PROCEDURE — 99214 OFFICE O/P EST MOD 30 MIN: CPT | Mod: 25

## 2025-06-16 PROCEDURE — 96372 THER/PROPH/DIAG INJ SC/IM: CPT

## 2025-06-16 RX ORDER — CYANOCOBALAMIN 1000 UG/ML
1000 INJECTION, SOLUTION INTRAMUSCULAR; SUBCUTANEOUS
Qty: 0 | Refills: 0 | Status: COMPLETED | OUTPATIENT
Start: 2025-06-16

## 2025-06-16 RX ADMIN — CYANOCOBALAMIN 1 MCG/ML: 1000 INJECTION, SOLUTION INTRAMUSCULAR at 00:00

## 2025-06-17 LAB
25(OH)D3 SERPL-MCNC: 27.1 NG/ML
ALBUMIN SERPL ELPH-MCNC: 4.2 G/DL
ALP BLD-CCNC: 48 U/L
ALT SERPL-CCNC: 23 U/L
ANION GAP SERPL CALC-SCNC: 11 MMOL/L
APTT BLD: 30.4 SEC
AST SERPL-CCNC: 16 U/L
BASOPHILS # BLD AUTO: 0.03 K/UL
BASOPHILS NFR BLD AUTO: 0.5 %
BILIRUB SERPL-MCNC: 0.5 MG/DL
BUN SERPL-MCNC: 15 MG/DL
CALCIUM SERPL-MCNC: 9.1 MG/DL
CHLORIDE SERPL-SCNC: 105 MMOL/L
CO2 SERPL-SCNC: 24 MMOL/L
CREAT SERPL-MCNC: 0.67 MG/DL
EGFRCR SERPLBLD CKD-EPI 2021: 116 ML/MIN/1.73M2
EOSINOPHIL # BLD AUTO: 0.18 K/UL
EOSINOPHIL NFR BLD AUTO: 3.2 %
ESTIMATED AVERAGE GLUCOSE: 94 MG/DL
GLUCOSE SERPL-MCNC: 83 MG/DL
HBA1C MFR BLD HPLC: 4.9 %
HCT VFR BLD CALC: 40.3 %
HGB BLD-MCNC: 13.2 G/DL
IMM GRANULOCYTES NFR BLD AUTO: 0.2 %
INR PPP: 0.94 RATIO
LYMPHOCYTES # BLD AUTO: 1.31 K/UL
LYMPHOCYTES NFR BLD AUTO: 23.2 %
MAN DIFF?: NORMAL
MCHC RBC-ENTMCNC: 32 PG
MCHC RBC-ENTMCNC: 32.8 G/DL
MCV RBC AUTO: 97.6 FL
MONOCYTES # BLD AUTO: 0.4 K/UL
MONOCYTES NFR BLD AUTO: 7.1 %
NEUTROPHILS # BLD AUTO: 3.71 K/UL
NEUTROPHILS NFR BLD AUTO: 65.8 %
PLATELET # BLD AUTO: 228 K/UL
POTASSIUM SERPL-SCNC: 5.2 MMOL/L
PROT SERPL-MCNC: 6.9 G/DL
PT BLD: 11.1 SEC
RBC # BLD: 4.13 M/UL
RBC # FLD: 13.1 %
SODIUM SERPL-SCNC: 141 MMOL/L
VIT B12 SERPL-MCNC: 494 PG/ML
WBC # FLD AUTO: 5.64 K/UL

## 2025-07-10 ENCOUNTER — APPOINTMENT (OUTPATIENT)
Dept: ULTRASOUND IMAGING | Facility: CLINIC | Age: 35
End: 2025-07-10
Payer: COMMERCIAL

## 2025-07-10 ENCOUNTER — OUTPATIENT (OUTPATIENT)
Dept: OUTPATIENT SERVICES | Facility: HOSPITAL | Age: 35
LOS: 1 days | End: 2025-07-10
Payer: COMMERCIAL

## 2025-07-10 DIAGNOSIS — E04.1 NONTOXIC SINGLE THYROID NODULE: ICD-10-CM

## 2025-07-10 PROCEDURE — 76536 US EXAM OF HEAD AND NECK: CPT

## 2025-07-10 PROCEDURE — 76536 US EXAM OF HEAD AND NECK: CPT | Mod: 26

## 2025-07-11 ENCOUNTER — APPOINTMENT (OUTPATIENT)
Dept: INTERNAL MEDICINE | Facility: CLINIC | Age: 35
End: 2025-07-11

## 2025-08-19 DIAGNOSIS — E66.01 MORBID (SEVERE) OBESITY DUE TO EXCESS CALORIES: ICD-10-CM

## 2025-08-19 RX ORDER — TIRZEPATIDE 12.5 MG/.5ML
12.5 INJECTION, SOLUTION SUBCUTANEOUS
Qty: 4 | Refills: 1 | Status: ACTIVE | COMMUNITY
Start: 2025-08-19 | End: 1900-01-01